# Patient Record
(demographics unavailable — no encounter records)

---

## 2025-05-12 NOTE — REASON FOR VISIT
[FreeTextEntry1] :    Introduction: I had the pleasure of seeing Gerardo Duarte, age ~6.9 years, for a consultation.  Gerardo was accompanied today by his mother and his older sister Desiree.  Cameroonian Int: [phone]  HISTORY OF PRESENTING CONCERNS:  Concerns noted on our intake paperwork include: -speech delay  Today's Concerns: -speech delay  Communication/Speech: -receptive weaknesses noted at home - more pronounced in Cameroonian than in English (asks videos to be in English). When asked which lang he prefers however he says Cameroonian -talking in school but mother reports not talking as much as she might expect -understands some multiple step instructions (bring me some water) but other instructions doesn't understand -parent speaks to him in Cameroonian, others speaks to him in English -speaks in sentences -around 3 yo - wasn't speaking much -Used AAC in the past  Other behaviors: -Shared enjoyment: demonstrates frequently - now and when younger -Pretend play: demonstrates frequently, now and when younger -Joint attention: demonstrates now and when younger -Social: warm, goes to family members in [employer] and sits with them - if given candy looks excitedly to parent to show her -odd vocalization - dgatichai - when excited - always has done - more for himself rather than to show others -Eye contact: very timid, may avoid EC initially but once comfortable makes good/robust eye contact - weak when getting to know someone.  -In [employer] - when younger -if someone approached - he would say no, don't touch/talk to me, now less timid/shy.  Behavioral:  Emotional:  Social/Play Skills:  Atypical Behaviors/Sensory:  Motor Abilities:   ADAPTIVE FUNCTIONING:  Self-Care:  Toileting:  Feeding:  Sleep:  EDUCATIONAL HISTORY/INTERVENTIONS: Academics: -barely able to write his name, can say the numbers/colors/ABC - below GL. Cannot read. Diff recognizing letters/numbers  School Name: [employer] in [place] Grade:1st grade Services: -likely specialized class - parent notes appears classmates have developmental issues (similar class in ) -OT -SLT  PreK:  -likely SPED classroom program -AAC used and helpful   PREVIOUS ASSESSMENTS/SERVICES:  *EI: -no evaluation  *CPSE:  -received services  MEDICAL HISTORY:  Current Medications: -none  Medication History: -none for behavior  Allergies: -none  Birth History: -born by scheduled - C/S, no complications during delivery -high risk pregnancy - told chance might have Down Syndrome, once born told no Down syndrome  ROS: A 10-point review of systems was performed. No concerns regarding vision and hearing. No cardiovascular, respiratory, gastrointestinal, renal, endocrine, or neurologic concerns. -born with multiple cherry hemangiomas - improved overtime  Audiology: -parent doesn't have hearing concern  Hospitalizations/ Surgeries: -lump/tumor in his shoulder (3 yo)  FAMILY HISTORY: His father is a  His mother is a technician He has a sister (2014) - Desiree  There is a family history/extended family history of:  -birth defect: MGM - hip issue/ hobbles, leg- length discrepancy -Speech delay: cousin -improved overtime -Stroke:  MGM - first stroke 59 yr -learning diff/ADHD concern: sister  There is no family history/extended family history of depression, anxiety, OCD, schizophrenia, bipolar disorder, ADHD, Autism/PDD/Asperger syndrome, intellectual disability  There is no history of congenital heart issues, heart rhythm problems, or of sudden death.   SOCIAL HISTORY: -lives with his immediate family -Cameroonian is spoken at home   PE (5/12/25): Constitutional: Well appearing. No acute distress. Dysmorphic Features: No dysmorphic features noted. Forward/advanced hair line Skin: ~hemangioma appearing skin finding back of left arm ~4 more hyperpigmented macule appearing marks on his torso - mother reports they were cherry hemangiomas which improved Eyes: Pupils, equal, round, reactive to light. Extraocular movements grossly intact. Ear, Nose, Mouth, Throat: Moist mucous membranes. No pharyngeal injection. Normal appearing uvula and palate. Cardiovascular: S1,S2.  Regular rate and rhythm. Respiratory: Clear to auscultation bilaterally. Gastrointestinal: Soft, non-tender, non-distended.  Genitourinary:  Normal appearing male external genitalia. Musculoskeletal/Neuro: fair strength and tone Motor: Normal appearing gait.   Observations (5/12/25 ): -reduced EC, made it when asked to during exam and during social interactions although not always maintained. Improved significant over course of the visit - often demonstrated appropriate EC w/ myself, parent, sister as visit progressed. -responded to humorous attempts/social bids -responded 7 yo when asked age but answered hesitantly, later when asked said 6yo. When asked when turning 6 yo he said he didn't know - and looked at his mother -excited to see "Buzz' in the toy bin - didn't respond to me when I demonstrated excitement - as visit progressed more responsive to my comments/interactions -fair amount of joint attention and shared enjoyment during evaluation -played nicely with his sister for over 90 minutes, frequent pretend play - particularly with a toy frog toy -asked which toy he liked best in office - he said he likes Hulk the past (not present in office) - asked again and he said Capt Virginie -spoke in sentences -fair articulation -well behaved -no stereotypies noted during visit  * LABORATORY/TEST RESULTS/DEVELOPMENTAL ASSESSMENTS:  [place] Assessment  -Informant: Caregiver on Intake Form -Inattention: 2/9 -Hyperactivity/Impulsivity: 0/9

## 2025-05-12 NOTE — PLAN
[FreeTextEntry3] : 1. Requested: -IEP- school service plan -CPSE (Committee on  Special Education)/ school district initial evaluation reports: Psychological, Developmental/Educational, Speech therapy, Occupational therapy, Physical therapy, etc -Triennial review reports- CSE (Committee on Special Education)/School district evaluation reports: Psychological, Educational, Speech therapy, Occupational therapy, Physical therapy, etc -Recent Report Card FORMS: -Teacher intake form -Teacher JAVON form -Parent Bruna form - [place]   2. Next visit: -ask about hand waving, continue hx taking -review requested info  3. Following completion of this two part initial consultation, a consultation report will be completed and sent out to the child's pediatrician and the child's parents.

## 2025-05-19 NOTE — PLAN
[FreeTextEntry3] : 1. Requested: -IEP- school service plan -CPSE (Committee on  Special Education)/ school district initial evaluation reports: Psychological, Developmental/Educational, Speech therapy, Occupational therapy, Physical therapy, etc -Triennial review reports- CSE (Committee on Special Education)/School district evaluation reports: Psychological, Educational, Speech therapy, Occupational therapy, Physical therapy, etc -Recent Report Card FORMS: -Teacher comments -Teacher intake form -Teacher JAVON form -Parent Bruna form - Maur[place]   Parents reports teachers said had sent in forms - nothing received - parent to reach out again  2. Next visit: -further eval his socialization, continue hx -review requested info  3. Following completion of this two part initial consultation, a consultation report will be completed and sent out to the child's pediatrician and the child's parents.

## 2025-05-19 NOTE — REASON FOR VISIT
[FreeTextEntry1] :    Introduction: I had the pleasure of seeing Gerardo Duarte, age ~6.9 years, for a consultation.  Gerardo was accompanied today by his mother and his older sister Desiree.  Nigerian Int: [id number]  HISTORY OF PRESENTING CONCERNS:  Concerns noted on our intake paperwork include: -speech delay  Today's Concerns: -speech delay -perhaps Autism concern   Communication/Speech: -receptive weaknesses noted at home - more pronounced in Nigerian than in English (asks videos to be in English). When asked which lang he prefers however he says Nigerian -talking in school but mother reports not talking as much as she might expect -understands some multiple step instructions (bring me some water) but other instructions doesn't understand -parent speaks to him in Nigerian, others speak to him in English -speaks in sentences -around 5 yo - wasn't speaking much -Used AAC in the past  Other behaviors/Autism concern: -attends [facility] 8:1:1 CHANG based program - mother reports hasn't been given dx/classification of ASD -Hand flapping: when younger- flapped his hands/twisted his hands - often when watching TV -not lately seen (parents reminded him frequently not to do) -Scripting type behavior/Mimicry/~Echolalia: tries replicating/mimicking sounds/words/activities he sees on TV - in the moment when watching and also later in context and out of context- decreasing overtime. If  speaking -Gerardo seems to mouth/copy what he is saying -Shared enjoyment: demonstrates frequently - now and when younger -Pretend play: demonstrates frequently, now and when younger -Joint attention: demonstrates now and when younger -odd vocalization - dgatichai - when excited - always has done - more for himself rather than to show others -Eye contact: very timid, may avoid EC initially but once comfortable makes good/robust eye contact - weak when getting to know someone. At home eye contact has always been good -Response to name: good now and when younger -In [employer] - when younger -if someone approached - he would say no, don't touch/talk to me, now less timid/shy. -Regression: none -may mimic mother's behaviors - but usually just if trying to be silly -Lining up/Sorting: none in general -Sensory: Noise -covers ears if loud noise - infrequent - noticing in N[employer] if too loud - other people aren't covering their ears though. Tactile/texture - no major issues. Food - no texture/brand issues. Smell - sometimes tries smelling mother, also likes smelling his favorite blanket -no finger flicking -Visual inspection: none in general -unusual visual regard: none -Play: when playing tends to play appropriately. Demonstrates pretend play. -Routines/Transitions: generally flexible  -Social: warm, goes to family members in [employer] and sits with them - if given candy looks excitedly to parent to show her.  -Social interest: PAST -limited interest NOW - fair interest, wants to play with other children  Behavioral: -Home: calm, fair behavior  Emotional: -shy: if busy place looks down and doesn't talk to anyone - warms up after a few minutes -fears the rain - closes doors/shuts down.  Motor Abilities:   ADAPTIVE FUNCTIONING:  Self-Care:  Toileting:  Feeding: -fair variety.  -resists trying new things - once tries tends to like -dislikes dressings/ketchup/salsas  Sleep:  EDUCATIONAL HISTORY/INTERVENTIONS: Academics: -barely able to write his name, can say the numbers/colors/ABC - below GL. Cannot read. Diff recognizing letters/numbers  School Name: [employer] in [place] Grade:1st grade Services: -SC 8:1:1 (similar class in ) -1:1 aide -OT -SLT -Parent training -[facility]  PreK:  -likely SPED classroom program -AAC used and helpful   *[facility] progress report (4/2025): -[employer] 8:1:1 CHANG based program -target: refusal - crying, dropping to floor, putting randall up, turning away from staff, becoming verbally unresponsive, swipes materials, throwing materials -inconsistent response to BIP  PREVIOUS ASSESSMENTS/SERVICES:  *EI: -no evaluation  *CPSE:  -received services  MEDICAL HISTORY:  Current Medications: -none  Medication History: -none for behavior  Allergies: -none  Birth History: -born by scheduled - C/S, no complications during delivery -high risk pregnancy - told chance might have Down Syndrome, once born told no Down syndrome  ROS: A 10-point review of systems was performed. No concerns regarding vision and hearing. No cardiovascular, respiratory, gastrointestinal, renal, endocrine, or neurologic concerns. -born with multiple cherry hemangiomas - improved overtime  Audiology: -parent doesn't have hearing concern  Hospitalizations/ Surgeries: -lump/tumor in his shoulder (3 yo)  FAMILY HISTORY: His father is a  His mother is a technician He has a sister (2014) - Desiree  There is a family history/extended family history of:  -birth defect: MGM - hip issue/ hobbles, leg- length discrepancy -Speech delay: cousin -improved overtime -Stroke:  MGM - first stroke 59 yr -learning diff/ADHD concern: sister  There is no family history/extended family history of depression, anxiety, OCD, schizophrenia, bipolar disorder, ADHD, Autism/PDD/Asperger syndrome, intellectual disability  There is no history of congenital heart issues, heart rhythm problems, or of sudden death.   SOCIAL HISTORY: -lives with his immediate family -Nigerian is spoken at home   PE (5/12/25): Constitutional: Well appearing. No acute distress. Dysmorphic Features: No dysmorphic features noted. Forward/advanced hair line Skin: ~hemangioma appearing skin finding back of left arm ~4 more hyperpigmented macule appearing marks on his torso - mother reports they were cherry hemangiomas which improved Eyes: Pupils, equal, round, reactive to light. Extraocular movements grossly intact. Ear, Nose, Mouth, Throat: Moist mucous membranes. No pharyngeal injection. Normal appearing uvula and palate. Cardiovascular: S1,S2.  Regular rate and rhythm. Respiratory: Clear to auscultation bilaterally. Gastrointestinal: Soft, non-tender, non-distended.  Genitourinary:  Normal appearing male external genitalia. Musculoskeletal/Neuro: fair strength and tone Motor: Normal appearing gait.   Observations (5/12/25 ): -reduced EC, made it when asked to during exam and during social interactions although not always maintained. Improved significantly over course of the visit - often demonstrated appropriate EC w/ myself, parent, sister as visit progressed. -responded to humorous attempts/social bids -responded 5 yo when asked age but answered hesitantly, later when asked said 8yo. When asked when turning 6 yo he said he didn't know - and looked at his mother -excited to see "Buzz' in the toy bin - didn't respond to me when I demonstrated excitement - as visit progressed more responsive to my comments/interactions -fair amount of joint attention and shared enjoyment during evaluation -played nicely with his sister for over 90 minutes, frequent pretend play - particularly with a toy frog toy -asked which toy he liked best in office - he said he likes Hulk the best (not present in office) - asked again and he said Capt Virginie -spoke in sentences -fair articulation -well behaved -no stereotypies noted during visit (5/2025 in person): -similar as past visit, friendly eventually - initially briefly avoided engagement and eye contact but quickly improved with frequent EC -a few toys rubbed against his face -demonstrated shared enjoyment and joint attention -well behaved for the ~2 hour visit -able to identify body parts on his toy Godzilla and on people -atypical social approach -some pretend play - mostly with toy jumping frog again and his dinosaur -receptive to social bids, friendly -Briefly looked up and squinted   * LABORATORY/TEST RESULTS/DEVELOPMENTAL ASSESSMENTS:  [place] Assessment  -Informant: Caregiver on Intake Form -Inattention: 2/9 -Hyperactivity/Impulsivity: 0/9